# Patient Record
(demographics unavailable — no encounter records)

---

## 2025-01-17 NOTE — HISTORY OF PRESENT ILLNESS
[FreeTextEntry1] : I saw her initially 7/17/2024 with the following history. This 63-year-old woman was seen in neurological consultation today. She reports seizure disorder dating back to age of 4 She has been following with a neurologist in [place] and comes here today for the first time and has no prior records available Says her last seizure was over 10 years ago She cannot really give me a description of what type of seizures she had Currently on Keppra 750 mg twice a day and Lamictal extended release 300 mg a day  Medical history otherwise unremarkable  she does not drive.  She lives with her sister She is on disability.  Returns today, 1/17/2025 for follow-up. I was able to review records from her prior treating neurologist after her initial visit and no changes indicated She corrects her prior history and tells me that her last seizure was 5/15/2023 She remains on Keppra 750 mg twice a day and Lamictal extended release 300 mg a day No further seizures

## 2025-01-17 NOTE — PHYSICAL EXAM
[General Appearance - Alert] : alert [General Appearance - In No Acute Distress] : in no acute distress [General Appearance - Well-Appearing] : healthy appearing [Oriented To Time, Place, And Person] : oriented to person, place, and time [Impaired Insight] : insight and judgment were intact [Affect] : the affect was normal [Memory Recent] : recent memory was not impaired [Person] : oriented to person [Place] : oriented to place [Time] : oriented to time [Concentration Intact] : normal concentrating ability [Fluency] : fluency intact [Comprehension] : comprehension intact [Past History] : adequate knowledge of personal past history [Cranial Nerves Optic (II)] : visual acuity intact bilaterally,  visual fields full to confrontation, pupils equal round and reactive to light [Cranial Nerves Oculomotor (III)] : extraocular motion intact [Cranial Nerves Trigeminal (V)] : facial sensation intact symmetrically [Cranial Nerves Facial (VII)] : face symmetrical [Cranial Nerves Vestibulocochlear (VIII)] : hearing was intact bilaterally [Cranial Nerves Glossopharyngeal (IX)] : tongue and palate midline [Cranial Nerves Accessory (XI - Cranial And Spinal)] : head turning and shoulder shrug symmetric [Cranial Nerves Hypoglossal (XII)] : there was no tongue deviation with protrusion [Motor Tone] : muscle tone was normal in all four extremities [Motor Strength] : muscle strength was normal in all four extremities [No Muscle Atrophy] : normal bulk in all four extremities [Paresis Pronator Drift Right-Sided] : no pronator drift on the right [Paresis Pronator Drift Left-Sided] : no pronator drift on the left [Sensation Tactile Decrease] : light touch was intact [Sensation Pain / Temperature Decrease] : pain and temperature was intact [Romberg's Sign] : Romberg's sign was negtive [Abnormal Walk] : normal gait [Balance] : balance was intact [Past-pointing] : there was no past-pointing [Tremor] : no tremor present [Coordination - Dysmetria Impaired Finger-to-Nose Bilateral] : not present [2+] : Patella left 2+ [PERRL With Normal Accommodation] : pupils were equal in size, round, reactive to light, with normal accommodation [Extraocular Movements] : extraocular movements were intact [Full Visual Field] : full visual field

## 2025-01-17 NOTE — ASSESSMENT
[FreeTextEntry1] : Patient has had seizures dating back to age 4 Currently on Keppra 750 mg twice a day and Lamictal extended release 300 mg a day Well-controlled, last seizure 5/15/2023 Medications refilled  Routine office follow-up planned in 6 months and by phone prior to that as needed.

## 2025-07-17 NOTE — HISTORY OF PRESENT ILLNESS
[FreeTextEntry1] : I saw her initially 7/17/2024 with the following history. This 63-year-old woman was seen in neurological consultation today. She reports seizure disorder dating back to age of 4 She has been following with a neurologist in [place] and comes here today for the first time and has no prior records available Says her last seizure was over 10 years ago She cannot really give me a description of what type of seizures she had Currently on Keppra 750 mg twice a day and Lamictal extended release 300 mg a day  Medical history otherwise unremarkable  she does not drive.  She lives with her sister She is on disability.  Returned 1/17/2025 for follow-up. I was able to review records from her prior treating neurologist after her initial visit and no changes indicated She corrects her prior history and tells me that her last seizure was 5/15/2023 She remains on Keppra 750 mg twice a day and Lamictal extended release 300 mg a day No further seizures  7/17/2025: Few days after her last visit her sister contacted me and informed me that the correct dose of her Keppra is Keppra  mg 2 pills twice a day She had an aura that was brief in March No convulsive seizures.

## 2025-07-17 NOTE — ASSESSMENT
[FreeTextEntry1] : Patient has had seizures dating back to age 4 Currently on KeppraXR 750 mg 2 pills twice a day and Lamictal extended release 300 mg a day Well-controlled, last seizure 5/15/2023 Medications refilled  Routine office follow-up planned in 6 months and by phone prior to that as needed.  Blood pressure noted to be high Patient and her sister are aware and they will follow-up with their primary care doctor as soon as possible